# Patient Record
Sex: FEMALE | URBAN - NONMETROPOLITAN AREA
[De-identification: names, ages, dates, MRNs, and addresses within clinical notes are randomized per-mention and may not be internally consistent; named-entity substitution may affect disease eponyms.]

---

## 2020-10-07 ENCOUNTER — DOCTOR'S OFFICE (OUTPATIENT)
Dept: URBAN - NONMETROPOLITAN AREA CLINIC 1 | Facility: CLINIC | Age: 40
Setting detail: OPHTHALMOLOGY
End: 2020-10-07
Payer: COMMERCIAL

## 2020-10-07 DIAGNOSIS — H52.13: ICD-10-CM

## 2020-10-07 PROCEDURE — 92310 CONTACT LENS FITTING OU: CPT | Performed by: OPTOMETRIST

## 2020-10-07 PROCEDURE — 92004 COMPRE OPH EXAM NEW PT 1/>: CPT | Performed by: OPTOMETRIST

## 2020-10-07 ASSESSMENT — CONFRONTATIONAL VISUAL FIELD TEST (CVF)
OD_FINDINGS: FULL
OS_FINDINGS: FULL

## 2020-10-21 ENCOUNTER — OPTICAL OFFICE (OUTPATIENT)
Dept: URBAN - NONMETROPOLITAN AREA CLINIC 4 | Facility: CLINIC | Age: 40
Setting detail: OPHTHALMOLOGY
End: 2020-10-21
Payer: COMMERCIAL

## 2020-10-21 DIAGNOSIS — H52.13: ICD-10-CM

## 2020-10-21 PROBLEM — G43.809 OCULAR MIGRAINE W/OUT INTRACTABLE ; BOTH EYES: Status: ACTIVE | Noted: 2020-10-07

## 2020-10-21 PROCEDURE — S0500 DISPOS CONT LENS: HCPCS | Performed by: OPTOMETRIST

## 2020-10-21 ASSESSMENT — REFRACTION_MANIFEST
OS_CYLINDER: -0.50
OD_SPHERE: -4.75
OD_CYLINDER: -0.50
OS_VA1: 20/20-2
OS_AXIS: 065
OD_VA1: 20/20
OS_SPHERE: -5.25
OD_AXIS: 130

## 2020-10-21 ASSESSMENT — REFRACTION_AUTOREFRACTION
OS_AXIS: 64
OS_CYLINDER: -0.50
OD_AXIS: 132
OS_SPHERE: -5.25
OD_CYLINDER: -0.50
OD_SPHERE: -4.75

## 2020-10-21 ASSESSMENT — VISUAL ACUITY
OS_BCVA: 20/25-1
OD_BCVA: 20/30-2

## 2020-10-21 ASSESSMENT — SPHEQUIV_DERIVED
OS_SPHEQUIV: -5.5
OD_SPHEQUIV: -5
OD_SPHEQUIV: -5
OS_SPHEQUIV: -5.5

## 2022-08-03 ENCOUNTER — TRANSCRIBE ORDERS (OUTPATIENT)
Dept: CARDIOLOGY CLINIC | Facility: CLINIC | Age: 42
End: 2022-08-03

## 2022-08-03 DIAGNOSIS — D86.9 SARCOIDOSIS: Primary | ICD-10-CM

## 2022-08-30 DIAGNOSIS — D86.9 SARCOIDOSIS, UNSPECIFIED: ICD-10-CM

## 2022-10-21 ENCOUNTER — HOSPITAL ENCOUNTER (OUTPATIENT)
Dept: NON INVASIVE DIAGNOSTICS | Facility: HOSPITAL | Age: 42
Discharge: HOME | End: 2022-10-21
Attending: INTERNAL MEDICINE
Payer: COMMERCIAL

## 2022-10-21 VITALS
DIASTOLIC BLOOD PRESSURE: 72 MMHG | WEIGHT: 128 LBS | HEART RATE: 64 BPM | SYSTOLIC BLOOD PRESSURE: 110 MMHG | BODY MASS INDEX: 21.85 KG/M2 | HEIGHT: 64 IN

## 2022-10-21 DIAGNOSIS — D86.9 SARCOIDOSIS, UNSPECIFIED: ICD-10-CM

## 2022-10-21 LAB
AORTIC ROOT: 3.3 CM
AORTIC VALVE MEAN VELOCITY: 6.4 M/S
APICAL FOUR CHAMBER EJECTION FRACTION: 62 %
ASCENDING AORTA: 2.5 CM
AV AREA BY CONTINUOUS VTI: 2.5 CM2
AV AREA PEAK VELOCITY: 2.4 CM2
AV LVOT MEAN GRADIENT: 2 MMHG
AV LVOT PEAK GRADIENT: 4 MMHG
AV MEAN GRADIENT: 2 MMHG
AV PEAK GRADIENT: 6 MMHG
AV VALVE AREA: 2.53 CM2
AV VELOCITY RATIO: 0.85
DOP CALC AO PEAK VEL: 1.18 M/S
DOP CALC AO VTI: 24.04 CM
DOP CALC LVOT AREA: 2.83 CM2
DOP CALC LVOT DIAMETER: 1.9 CM
DOP CALC LVOT PEAK VEL VTI: 21.45 CM
DOP CALC LVOT PEAK VEL: 1 M/S
DOP CALC LVOT STROKE INDEX: 39.1 ML/M2
DOP CALC LVOT STROKE VOLUME: 60.79 CM3
E WAVE DECELERATION TIME: 213 MS
FRACTIONAL SHORTENING: 20 % (ref 28–44)
INTERVENTRICULAR SEPTUM IN DIASTOLE (PARASTERNAL SHORT AXIS VIEW): 0.8 CM
INTERVENTRICULAR SEPTUM: 0.8 CM (ref 0.6–1.1)
LAAS-AP2: 17.1 CM2
LAAS-AP4: 17.2 CM2
LEFT ATRIUM SIZE: 3.1 CM
LEFT INTERNAL DIMENSION IN SYSTOLE: 3.3 CM (ref 2.1–4)
LEFT VENTRICLE DIASTOLIC VOLUME (MOD BIPLANE): 110 ML
LEFT VENTRICLE SYSTOLIC VOLUME (MOD BIPLANE): 49 ML
LEFT VENTRICULAR INTERNAL DIMENSION IN DIASTOLE: 4.1 CM (ref 3.5–6)
LEFT VENTRICULAR POSTERIOR WALL IN END DIASTOLE: 0.9 CM
LEFT VENTRICULAR STROKE VOLUME: 29 ML
LV EF: 56 %
LVSV (TEICH): 29 ML
MV E'TISSUE VEL-LAT: 14 CM/S
MV E'TISSUE VEL-SEP: 10 CM/S
MV PEAK A VEL: 0.62 M/S
MV PEAK E VEL: 71 CM/S
MV STENOSIS PRESSURE HALF TIME: 62 MS
MV VALVE AREA P 1/2 METHOD: 3.55 CM2
PV PEAK GRADIENT: 3 MMHG
RIGHT ATRIUM AREA SYSTOLE A4C: 10.1 CM2
RIGHT VENTRICLE ID DIMENSION: 3.3 CM
SL CV LEFT ATRIUM LENGTH A2C: 4.7 CM
SL CV PED ECHO LEFT VENTRICLE DIASTOLIC VOLUME (MOD BIPLANE) 2D: 74 ML
SL CV PED ECHO LEFT VENTRICLE SYSTOLIC VOLUME (MOD BIPLANE) 2D: 45 ML

## 2022-10-21 PROCEDURE — 93306 TTE W/DOPPLER COMPLETE: CPT

## 2022-11-07 RX ORDER — CYCLOBENZAPRINE HCL 10 MG
TABLET ORAL
COMMUNITY

## 2022-11-07 RX ORDER — IBUPROFEN 200 MG
TABLET ORAL
COMMUNITY

## 2022-11-07 RX ORDER — DICLOFENAC SODIUM 75 MG/1
75 TABLET, DELAYED RELEASE ORAL 2 TIMES DAILY
COMMUNITY
Start: 2022-10-04

## 2022-11-07 RX ORDER — DIPHENOXYLATE HYDROCHLORIDE AND ATROPINE SULFATE 2.5; .025 MG/1; MG/1
1 TABLET ORAL
COMMUNITY

## 2022-11-09 ENCOUNTER — CONSULT (OUTPATIENT)
Dept: PULMONOLOGY | Facility: CLINIC | Age: 42
End: 2022-11-09

## 2022-11-09 VITALS
HEIGHT: 64 IN | BODY MASS INDEX: 22.2 KG/M2 | SYSTOLIC BLOOD PRESSURE: 122 MMHG | WEIGHT: 130 LBS | TEMPERATURE: 97.3 F | OXYGEN SATURATION: 100 % | DIASTOLIC BLOOD PRESSURE: 70 MMHG | HEART RATE: 65 BPM

## 2022-11-09 DIAGNOSIS — F17.210 CIGARETTE NICOTINE DEPENDENCE WITHOUT COMPLICATION: ICD-10-CM

## 2022-11-09 DIAGNOSIS — D86.9 SARCOIDOSIS: Primary | ICD-10-CM

## 2022-11-09 NOTE — PROGRESS NOTES
Pulmonary Consultation   Mt Parikh 43 y o  female MRN: [id number]  11/9/2022      Assessment:  Sarcoidosis   · Diagnosed in 03/2022 via multiple punch biopsies from skin papules at the legs   · Reported chronic dyspnea on moderate to severe exertion otherwise no signs or symptoms of active pulmonary sarcoid  · Recently evaluated by Nephrology/Cardiology  · No hypercalcemia, noted elevated ACE  · Recent QuantiFERON was negative    Plan:   · Discussed extensively about what to expect from sarcoidosis, multi organ disease that is commonly affecting the lungs   · For now, will obtain HRCT to check for alveolar sarcoid   · Complete PFT/BD response  · If no significant abnormality, will repeat PFT in 6 months  · Continue follow-up with Nephrology/Cardiology, scheduled to see ophthalmology to check for uveitis    Active tobacco abuse   · Counseled for about 3 minutes   · Agreeable to try nicotine patches/ordered    Return in about 8 months (around 7/9/2023)  History of Present Illness     New Consult for:  Sarcoidosis      Background  43 y o  female with a h/o lumbar radiculopathy, acid reflux, psoriasis, fibromyalgia, depressive disorder, active tobacco abuse, CKD 2, sarcoidosis of the skin    Per outside notes  She had a recurrent rash, underwent skin biopsy that was consistent with sarcoidosis  Also noted lower legs nodules in 03/2022  With single papule in  Biopsies showed granulomatous dermatitis/with a concern for sarcoidosis  Noted elevated ACE   quantiferon was negative  Reported some improvement of the skin rash at the leg with oral steroid round given for 10 days, after that seen by Dermatology at [facility] further punch biopsy confirmed sarcoid, currently on steroid cream     Presented today for initial evaluation by Pulmonary    Denies any new respiratory symptoms, stable for most of her life  Reported history of asthma as a child until age of 5, then faded away    Actively smokes 0 5 pack per day/for 25 years about 12 5 pack year  Only reports mild dyspnea on severe exertion such as going up 1 flight of stairs multiple times  Otherwise no chest heaviness, pain, tightness, cough, or sputum production  Seen by Cardiology/Nephrology, no significant issues with organ sarcoid  Reports chronic night sweats, however no weight loss, anorexia  No recent travels, no special hobbies of medical important    Review of Systems  As per hpi, all other systems reviewed and were negative  Social/exposure history   Lives in [facility] where she is originally from  About 12 5 pack year tobacco abuse history, smokes 0 5 pack per day, nonalcoholic  Works as a home health aide, 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 with her 2 sons and    Has 1 dog, no exposure to birds   Has an oil heat, clean AC window units    Studies:  Imaging and other studies: I have personally reviewed pertinent films in PACS  Per outside reports/chest x-ray was normal    Pulmonary function testing:   None on file    EKG, Pathology, and Other Studies: I have personally reviewed pertinent reports            Historical Information   Past Medical History:   Diagnosis Date   • Degenerative disc disease, lumbar    • Depressive disorder    • Fibromyalgia 12/30/2014   • Generalized anxiety disorder 09/19/2012   • GERD (gastroesophageal reflux disease)    • Idiopathic scoliosis    • Ovarian cyst    • Psoriasis      Past Surgical History:   Procedure Laterality Date   • CHOLECYSTECTOMY LAPAROSCOPIC     • LAPAROSCOPY     • OVARIAN CYST REMOVAL     • UMBILICAL HERNIA REPAIR       Family History   Problem Relation Age of Onset   • Hepatitis Mother    • Hypertension Father    • Heart disease Paternal Grandmother    • Prostate cancer Paternal Grandfather          Medications/Allergies: Reviewed    Vitals: Blood pressure 122/70, pulse 65, temperature (!) 97 3 °F (36 3 °C), height 5' 4" (1 626 m), weight 59 kg (130 lb), SpO2 100 %  Body mass index is 22 31 kg/m²  Oxygen Therapy  SpO2: 100 %      Physical Exam  Body mass index is 22 31 kg/m²  Gen: not in acute distress, thin  Neck/Eyes: supple, no adenopathy, PERRL  Ear: normal appearance, no significant hearing impairment  Nose:  normal nasal mucosa, no drainage  Mouth:  unremarkable/normal appearance of lips, teeth and gums  Oropharynx: mucosa is moist, no focal lesions or erythema  Salivary glands: soft nontender  Chest: normal respiratory efforts, clear breath sounds bilaterally  CV: RRR, no murmurs appreciated, no edema  Abdomen: soft, non tender  Extremities:  No observed deformity   Skin: unremarkable  Neuro: AAO X3, no focal motor deficit         Labs:  No results found for: WBC, HGB, HCT, MCV, PLT  No results found for: GLUCOSE, CALCIUM, NA, K, CO2, CL, BUN, CREATININE  No results found for: IGE  No results found for: ALT, AST, GGT, ALKPHOS, BILITOT        Portions of the record may have been created with voice recognition software  Occasional wrong word or "sound a like" substitutions may have occurred due to the inherent limitations of voice recognition software  Read the chart carefully and recognize, using context, where substitutions have occurred    ALICIA FuentesJoint [facility]

## 2022-11-22 ENCOUNTER — HOSPITAL ENCOUNTER (OUTPATIENT)
Dept: PULMONOLOGY | Facility: HOSPITAL | Age: 42
Discharge: HOME | End: 2022-11-22
Attending: INTERNAL MEDICINE

## 2022-11-22 ENCOUNTER — HOSPITAL ENCOUNTER (OUTPATIENT)
Dept: CT IMAGING | Facility: HOSPITAL | Age: 42
Discharge: HOME | End: 2022-11-22
Attending: INTERNAL MEDICINE

## 2022-11-22 DIAGNOSIS — D86.9 SARCOIDOSIS: ICD-10-CM

## 2022-11-22 RX ORDER — ALBUTEROL SULFATE 2.5 MG/3ML
2.5 SOLUTION RESPIRATORY (INHALATION) ONCE AS NEEDED
Status: COMPLETED | OUTPATIENT
Start: 2022-11-22 | End: 2022-11-22

## 2022-11-22 RX ADMIN — ALBUTEROL SULFATE 2.5 MG: 2.5 SOLUTION RESPIRATORY (INHALATION) at 12:33

## 2022-11-28 ENCOUNTER — TELEPHONE (OUTPATIENT)
Dept: PULMONOLOGY | Facility: CLINIC | Age: 42
End: 2022-11-28

## 2022-11-28 DIAGNOSIS — D86.9 SARCOIDOSIS: Primary | ICD-10-CM

## 2022-11-28 NOTE — TELEPHONE ENCOUNTER
Called patient reviewed results of high-resolution CT chest and pulmonary function test   High-resolution CT chest shows innumerable tiny nodules likely in the setting of known sarcoidosis  Thankfully pulmonary function tests are within normal limits  Dr Gomes Nurse recommends continued surveillance with repeat CT and PFTs in 6 months  Follow-up with Pulmonary in the office afterwards  Yana Clark, can you help with scheduling these and follow up when you have a chance please? Thanks  None known

## 2022-11-28 NOTE — TELEPHONE ENCOUNTER
Pt LM re: she would like a call back to discuss her CT chest and PFT results with the doctor    Please advise: [phone]

## 2023-11-16 ENCOUNTER — HOSPITAL ENCOUNTER (OUTPATIENT)
Dept: NON INVASIVE DIAGNOSTICS | Facility: HOSPITAL | Age: 43
Discharge: HOME | End: 2023-11-16
Attending: INTERNAL MEDICINE
Payer: COMMERCIAL

## 2023-11-16 VITALS
WEIGHT: 130.07 LBS | DIASTOLIC BLOOD PRESSURE: 70 MMHG | HEIGHT: 64 IN | BODY MASS INDEX: 22.21 KG/M2 | SYSTOLIC BLOOD PRESSURE: 120 MMHG | HEART RATE: 72 BPM

## 2023-11-16 DIAGNOSIS — D86.9 SARCOIDOSIS, UNSPECIFIED: ICD-10-CM

## 2023-11-16 LAB
AORTIC ROOT: 2.4 CM
AORTIC VALVE MEAN VELOCITY: 7.5 M/S
APICAL FOUR CHAMBER EJECTION FRACTION: 63 %
ASCENDING AORTA: 2.8 CM
AV AREA BY CONTINUOUS VTI: 3 CM2
AV AREA PEAK VELOCITY: 3.4 CM2
AV LVOT MEAN GRADIENT: 2 MMHG
AV LVOT PEAK GRADIENT: 5 MMHG
AV MEAN GRADIENT: 3 MMHG
AV PEAK GRADIENT: 4 MMHG
AV VALVE AREA: 2.95 CM2
AV VELOCITY RATIO: 1.08
DOP CALC AO PEAK VEL: 1.01 M/S
DOP CALC AO VTI: 24.11 CM
DOP CALC LVOT AREA: 3.14 CM2
DOP CALC LVOT CARDIAC INDEX: 2.91 L/MIN/M2
DOP CALC LVOT CARDIAC OUTPUT: 4.75 L/MIN
DOP CALC LVOT DIAMETER: 2 CM
DOP CALC LVOT PEAK VEL VTI: 22.66 CM
DOP CALC LVOT PEAK VEL: 1.09 M/S
DOP CALC LVOT STROKE INDEX: 42.9 ML/M2
DOP CALC LVOT STROKE VOLUME: 71.15
E WAVE DECELERATION TIME: 199 MS
E/A RATIO: 1.48
FRACTIONAL SHORTENING: 32 (ref 28–44)
INTERVENTRICULAR SEPTUM IN DIASTOLE (PARASTERNAL SHORT AXIS VIEW): 0.8 CM
INTERVENTRICULAR SEPTUM: 0.8 CM (ref 0.6–1.1)
LAAS-AP2: 17.4 CM2
LAAS-AP4: 14.4 CM2
LEFT ATRIUM SIZE: 2.8 CM
LEFT ATRIUM VOLUME (MOD BIPLANE): 43 ML
LEFT ATRIUM VOLUME INDEX (MOD BIPLANE): 26.4 ML/M2
LEFT INTERNAL DIMENSION IN SYSTOLE: 2.6 CM (ref 2.1–4)
LEFT VENTRICLE DIASTOLIC VOLUME (MOD BIPLANE): 94 ML
LEFT VENTRICLE SYSTOLIC VOLUME (MOD BIPLANE): 39 ML
LEFT VENTRICULAR INTERNAL DIMENSION IN DIASTOLE: 3.8 CM (ref 3.5–6)
LEFT VENTRICULAR POSTERIOR WALL IN END DIASTOLE: 0.9 CM
LEFT VENTRICULAR STROKE VOLUME: 35 ML
LV EF: 59 %
LVSV (TEICH): 35 ML
MV E'TISSUE VEL-LAT: 14 CM/S
MV E'TISSUE VEL-SEP: 8 CM/S
MV PEAK A VEL: 0.5 M/S
MV PEAK E VEL: 74 CM/S
MV STENOSIS PRESSURE HALF TIME: 58 MS
MV VALVE AREA P 1/2 METHOD: 3.79
PV PEAK GRADIENT: 4 MMHG
RIGHT ATRIUM AREA SYSTOLE A4C: 15.3 CM2
RIGHT VENTRICLE ID DIMENSION: 3 CM
SL CV LEFT ATRIUM LENGTH A2C: 4.7 CM
SL CV LV EF: 60
SL CV PED ECHO LEFT VENTRICLE DIASTOLIC VOLUME (MOD BIPLANE) 2D: 61 ML
SL CV PED ECHO LEFT VENTRICLE SYSTOLIC VOLUME (MOD BIPLANE) 2D: 26 ML
TRICUSPID ANNULAR PLANE SYSTOLIC EXCURSION: 2.7 CM

## 2023-11-16 PROCEDURE — 93306 TTE W/DOPPLER COMPLETE: CPT

## 2023-11-17 ENCOUNTER — TELEPHONE (OUTPATIENT)
Dept: PULMONOLOGY | Facility: CLINIC | Age: 43
End: 2023-11-17

## 2023-11-17 NOTE — TELEPHONE ENCOUNTER
Called and left voice message for patient to call schedule follow up appointment in office. Left scheduling phone number for them to callback.

## 2023-11-17 NOTE — TELEPHONE ENCOUNTER
----- Message from Katrina Maurice sent at 11/17/2023  1:41 PM EST -----  Regarding: FW: follow up appt  Please schedule thanks!  ----- Message -----  From: Eduardo Dominguez MD  Sent: 11/17/2023  12:19 PM EST  To: [facility]  Subject: follow up appt                                   Pt is due for follow up, pls call and schedule follow up with me first available is fine

## 2024-12-19 ENCOUNTER — TELEPHONE (OUTPATIENT)
Dept: PULMONOLOGY | Facility: CLINIC | Age: 44
End: 2024-12-19

## 2024-12-20 NOTE — TELEPHONE ENCOUNTER
Called and spoke to patient relayed Dr. Garcia message appointment scheduled 1/28/25 with Alivia Patient will call central scheduling to schedule PFT's./CT scan prior to appointment